# Patient Record
Sex: FEMALE | Race: ASIAN | NOT HISPANIC OR LATINO | URBAN - METROPOLITAN AREA
[De-identification: names, ages, dates, MRNs, and addresses within clinical notes are randomized per-mention and may not be internally consistent; named-entity substitution may affect disease eponyms.]

---

## 2023-07-09 ENCOUNTER — EMERGENCY (EMERGENCY)
Facility: HOSPITAL | Age: 63
LOS: 1 days | Discharge: HOME | End: 2023-07-09
Attending: EMERGENCY MEDICINE | Admitting: EMERGENCY MEDICINE
Payer: MEDICAID

## 2023-07-09 VITALS
WEIGHT: 118.83 LBS | SYSTOLIC BLOOD PRESSURE: 134 MMHG | TEMPERATURE: 98 F | HEART RATE: 82 BPM | OXYGEN SATURATION: 97 % | RESPIRATION RATE: 16 BRPM | DIASTOLIC BLOOD PRESSURE: 80 MMHG | HEIGHT: 60 IN

## 2023-07-09 VITALS
DIASTOLIC BLOOD PRESSURE: 78 MMHG | TEMPERATURE: 98 F | HEART RATE: 80 BPM | OXYGEN SATURATION: 99 % | RESPIRATION RATE: 16 BRPM | SYSTOLIC BLOOD PRESSURE: 115 MMHG

## 2023-07-09 LAB
ALBUMIN SERPL ELPH-MCNC: 4 G/DL — SIGNIFICANT CHANGE UP (ref 3.3–5)
ALP SERPL-CCNC: 90 U/L — SIGNIFICANT CHANGE UP (ref 30–120)
ALT FLD-CCNC: 29 U/L DA — SIGNIFICANT CHANGE UP (ref 10–60)
ANION GAP SERPL CALC-SCNC: 9 MMOL/L — SIGNIFICANT CHANGE UP (ref 5–17)
APTT BLD: 37.2 SEC — HIGH (ref 27.5–35.5)
AST SERPL-CCNC: 12 U/L — SIGNIFICANT CHANGE UP (ref 10–40)
BASOPHILS # BLD AUTO: 0.05 K/UL — SIGNIFICANT CHANGE UP (ref 0–0.2)
BASOPHILS NFR BLD AUTO: 0.5 % — SIGNIFICANT CHANGE UP (ref 0–2)
BILIRUB SERPL-MCNC: 0.6 MG/DL — SIGNIFICANT CHANGE UP (ref 0.2–1.2)
BUN SERPL-MCNC: 25 MG/DL — HIGH (ref 7–23)
CALCIUM SERPL-MCNC: 9.9 MG/DL — SIGNIFICANT CHANGE UP (ref 8.4–10.5)
CHLORIDE SERPL-SCNC: 103 MMOL/L — SIGNIFICANT CHANGE UP (ref 96–108)
CO2 SERPL-SCNC: 32 MMOL/L — HIGH (ref 22–31)
CREAT SERPL-MCNC: 0.87 MG/DL — SIGNIFICANT CHANGE UP (ref 0.5–1.3)
EGFR: 74 ML/MIN/1.73M2 — SIGNIFICANT CHANGE UP
EOSINOPHIL # BLD AUTO: 0.1 K/UL — SIGNIFICANT CHANGE UP (ref 0–0.5)
EOSINOPHIL NFR BLD AUTO: 1 % — SIGNIFICANT CHANGE UP (ref 0–6)
GLUCOSE SERPL-MCNC: 152 MG/DL — HIGH (ref 70–99)
HCT VFR BLD CALC: 38.8 % — SIGNIFICANT CHANGE UP (ref 34.5–45)
HGB BLD-MCNC: 13 G/DL — SIGNIFICANT CHANGE UP (ref 11.5–15.5)
IMM GRANULOCYTES NFR BLD AUTO: 0.6 % — SIGNIFICANT CHANGE UP (ref 0–0.9)
INR BLD: 1.13 RATIO — SIGNIFICANT CHANGE UP (ref 0.88–1.16)
LYMPHOCYTES # BLD AUTO: 1.84 K/UL — SIGNIFICANT CHANGE UP (ref 1–3.3)
LYMPHOCYTES # BLD AUTO: 17.5 % — SIGNIFICANT CHANGE UP (ref 13–44)
MCHC RBC-ENTMCNC: 32.3 PG — SIGNIFICANT CHANGE UP (ref 27–34)
MCHC RBC-ENTMCNC: 33.5 GM/DL — SIGNIFICANT CHANGE UP (ref 32–36)
MCV RBC AUTO: 96.3 FL — SIGNIFICANT CHANGE UP (ref 80–100)
MONOCYTES # BLD AUTO: 0.91 K/UL — HIGH (ref 0–0.9)
MONOCYTES NFR BLD AUTO: 8.7 % — SIGNIFICANT CHANGE UP (ref 2–14)
NEUTROPHILS # BLD AUTO: 7.56 K/UL — HIGH (ref 1.8–7.4)
NEUTROPHILS NFR BLD AUTO: 71.7 % — SIGNIFICANT CHANGE UP (ref 43–77)
NRBC # BLD: 0 /100 WBCS — SIGNIFICANT CHANGE UP (ref 0–0)
PLATELET # BLD AUTO: 435 K/UL — HIGH (ref 150–400)
POTASSIUM SERPL-MCNC: 3.6 MMOL/L — SIGNIFICANT CHANGE UP (ref 3.5–5.3)
POTASSIUM SERPL-SCNC: 3.6 MMOL/L — SIGNIFICANT CHANGE UP (ref 3.5–5.3)
PROT SERPL-MCNC: 8.1 G/DL — SIGNIFICANT CHANGE UP (ref 6–8.3)
PROTHROM AB SERPL-ACNC: 13 SEC — SIGNIFICANT CHANGE UP (ref 10.5–13.4)
RBC # BLD: 4.03 M/UL — SIGNIFICANT CHANGE UP (ref 3.8–5.2)
RBC # FLD: 13.2 % — SIGNIFICANT CHANGE UP (ref 10.3–14.5)
SODIUM SERPL-SCNC: 144 MMOL/L — SIGNIFICANT CHANGE UP (ref 135–145)
TROPONIN I, HIGH SENSITIVITY RESULT: 5.1 NG/L — SIGNIFICANT CHANGE UP
WBC # BLD: 10.52 K/UL — HIGH (ref 3.8–10.5)
WBC # FLD AUTO: 10.52 K/UL — HIGH (ref 3.8–10.5)

## 2023-07-09 PROCEDURE — 80053 COMPREHEN METABOLIC PANEL: CPT

## 2023-07-09 PROCEDURE — 85730 THROMBOPLASTIN TIME PARTIAL: CPT

## 2023-07-09 PROCEDURE — 71045 X-RAY EXAM CHEST 1 VIEW: CPT | Mod: 26

## 2023-07-09 PROCEDURE — 70496 CT ANGIOGRAPHY HEAD: CPT | Mod: MA

## 2023-07-09 PROCEDURE — 82962 GLUCOSE BLOOD TEST: CPT

## 2023-07-09 PROCEDURE — 36415 COLL VENOUS BLD VENIPUNCTURE: CPT

## 2023-07-09 PROCEDURE — 99285 EMERGENCY DEPT VISIT HI MDM: CPT

## 2023-07-09 PROCEDURE — 99285 EMERGENCY DEPT VISIT HI MDM: CPT | Mod: 25

## 2023-07-09 PROCEDURE — 93010 ELECTROCARDIOGRAM REPORT: CPT

## 2023-07-09 PROCEDURE — 71045 X-RAY EXAM CHEST 1 VIEW: CPT

## 2023-07-09 PROCEDURE — 70496 CT ANGIOGRAPHY HEAD: CPT | Mod: 26,MA

## 2023-07-09 PROCEDURE — 84484 ASSAY OF TROPONIN QUANT: CPT

## 2023-07-09 PROCEDURE — 70498 CT ANGIOGRAPHY NECK: CPT | Mod: MA

## 2023-07-09 PROCEDURE — 93005 ELECTROCARDIOGRAM TRACING: CPT

## 2023-07-09 PROCEDURE — 70498 CT ANGIOGRAPHY NECK: CPT | Mod: 26,MA

## 2023-07-09 PROCEDURE — 85025 COMPLETE CBC W/AUTO DIFF WBC: CPT

## 2023-07-09 PROCEDURE — 70450 CT HEAD/BRAIN W/O DYE: CPT | Mod: MA

## 2023-07-09 PROCEDURE — 85610 PROTHROMBIN TIME: CPT

## 2023-07-09 RX ORDER — VALACYCLOVIR 500 MG/1
1000 TABLET, FILM COATED ORAL ONCE
Refills: 0 | Status: COMPLETED | OUTPATIENT
Start: 2023-07-09 | End: 2023-07-09

## 2023-07-09 RX ORDER — VALACYCLOVIR 500 MG/1
1 TABLET, FILM COATED ORAL
Qty: 21 | Refills: 0
Start: 2023-07-09 | End: 2023-07-15

## 2023-07-09 RX ADMIN — VALACYCLOVIR 1000 MILLIGRAM(S): 500 TABLET, FILM COATED ORAL at 15:48

## 2023-07-09 RX ADMIN — Medication 60 MILLIGRAM(S): at 15:47

## 2023-07-09 NOTE — CONSULT NOTE ADULT - SUBJECTIVE AND OBJECTIVE BOX
Patient is a 63y old  Female who presents with a chief complaint of left facial droop    HPI:   #[id number]  64 y/o F Patient brought in by family for left facial weakness mild headache and lightheadedness since awoke at 6 AM this morning.  Patient was in her normal state of health when she went to bed at approximately 11 PM last night.  Patient relates when she woke up she her left side of her face seemed to be drooping and she could not keep food or drink in her mouth.  No nausea vomiting arm or leg symptoms chest pain short breath abdominal pain or any other complaints.  PMD in [place].  No weakness.  No numbness  No aphasia.  CT Head - No acute pathology  CTA head and Neck - Normal exam  Daughter is at bedside.    Home Medications: None     Allergies    No Known Allergies      SOCIAL HISTORY:    No h/o Smoking.   No h/o alcohol use.    FAMILY HISTORY: Asked the dtr. N/C    REVIEW OF SYSTEMS:    CONSTITUTIONAL: No fever  EYES: No eye pain,   ENMT:  No sinus or throat pain  NECK: No pain or stiffness  RESPIRATORY: No cough, No hemoptysis; No shortness of breath  CARDIOVASCULAR: No acute chest pain, palpitations,  or leg swelling  GASTROINTESTINAL: No abdominal pain. No nausea, vomiting, or hematemesis;    GENITOURINARY: No  hematuria, or incontinence  MUSCULOSKELETAL: No joint swelling; No extremity pain  SKIN: No itching, rashes, or lesions   LYMPH NODES: No enlarged glands  NEUROLOGICAL: No headaches, memory loss,   PSYCHIATRIC: No depression, anxiety, mood swings, or difficulty sleeping  ENDOCRINE: No heat or cold intolerance;   HEME/LYMPH: No easy bruising, or bleeding gums  Allergy/Immunology. No medication allergy. No seasonal allergies.    PHYSICAL EXAM:  Vital Signs Last 24 Hrs  T(F): 98.4 (07-09-23 @ 12:59)  HR: 85 (07-09-23 @ 12:59)  BP: 124/71 (07-09-23 @ 12:59)  RR: 16 (07-09-23 @ 12:59)    GENERAL: NAD, well-groomed, well-developed  HEAD:  Atraumatic, Normocephalic  EYES: EOMI, PERRLA, conjunctiva and sclera clear  NECK: Supple, No JVD, thyroid non-palpable    On Neurological Examination:    Mental Status - Pt is alert, awake, oriented X3. Higher functions are intact. Follows commands well and able to answer questions appropriately.    Speech -  Normal. Pt has no aphasia.    Cranial Nerves - Pupils 3 mm equal and reactive to light, extraocular eye movements intact. Pt has no visual field deficit.  Left facial asymmetry of peripheral type. Tongue - is in midline.    Motor Exam - 4 plus/5 all over, No drift. No shaking or tremors.  Muscle tone - is normal all over. Moves all extremities equally. No asymmetry is seen.      Sensory Exam - Pin prick, temperature, joint position and vibration are intact on either side.     Gait - Able to stand and walk unassisted. Pt is able to stand up with holding my hands and is able to walk for few feet around the bed. Not falling to either side.    Deep tendon Reflexes - 2 plus all over.    Coordination - Fine finger movements are normal on both sides. Finger to nose is also normal on both sides.       Romberg - Negative.    Neck Supple -  Yes.    LABS:                        13.0   10.52 )-----------( 435      ( 09 Jul 2023 12:00 )             38.8     07-09    144  |  103  |  25<H>  ----------------------------<  152<H>  3.6   |  32<H>  |  0.87    Ca    9.9      09 Jul 2023 12:00    TPro  8.1  /  Alb  4.0  /  TBili  0.6  /  DBili  x   /  AST  12  /  ALT  29  /  AlkPhos  90  07-09    PT/INR - ( 09 Jul 2023 12:00 )   PT: 13.0 sec;   INR: 1.13 ratio      PTT - ( 09 Jul 2023 12:00 )  PTT:37.2 sec  Urinalysis Basic - ( 09 Jul 2023 12:00 )    Color: x / Appearance: x / SG: x / pH: x  Gluc: 152 mg/dL / Ketone: x  / Bili: x / Urobili: x   Blood: x / Protein: x / Nitrite: x   Leuk Esterase: x / RBC: x / WBC x   Sq Epi: x / Non Sq Epi: x / Bacteria: x    RADIOLOGY & ADDITIONAL STUDIES:    < from: CT Angio Brain Stroke Protocol  w/ IV Cont (07.09.23 @ 12:11) >    IMPRESSION:    1.Right carotid system:    No hemodynamically significant stenosis.    2.   Left carotid system:     No hemodynamically significant stenosis.    3.   Vertebral circulation:    Patent.    4.  Anterior intracranial circulation:     Unremarkable.    5.  Posterior intracranial circulation:    Unremarkable.    6.  No large vessel occlusion.    7. Brain:   No infarct is identified.    < end of copied text >

## 2023-07-09 NOTE — ED ADULT TRIAGE NOTE - MODE OF ARRIVAL
Personalized Preventive Plan for Beatriz Solis - 9/28/2020  Medicare offers a range of preventive health benefits. Some of the tests and screenings are paid in full while other may be subject to a deductible, co-insurance, and/or copay. Some of these benefits include a comprehensive review of your medical history including lifestyle, illnesses that may run in your family, and various assessments and screenings as appropriate. After reviewing your medical record and screening and assessments performed today your provider may have ordered immunizations, labs, imaging, and/or referrals for you. A list of these orders (if applicable) as well as your Preventive Care list are included within your After Visit Summary for your review. Other Preventive Recommendations:    · A preventive eye exam performed by an eye specialist is recommended every 1-2 years to screen for glaucoma; cataracts, macular degeneration, and other eye disorders. · A preventive dental visit is recommended every 6 months. · Try to get at least 150 minutes of exercise per week or 10,000 steps per day on a pedometer . · Order or download the FREE \"Exercise & Physical Activity: Your Everyday Guide\" from [employer]. Call 5-995.236.6625 or search [employer] online. · You need 1338-7748 mg of calcium and 6343-6723 IU of vitamin D per day. It is possible to meet your calcium requirement with diet alone, but a vitamin D supplement is usually necessary to meet this goal.  · When exposed to the sun, use a sunscreen that protects against both UVA and UVB radiation with an SPF of 30 or greater. Reapply every 2 to 3 hours or after sweating, drying off with a towel, or swimming. · Always wear a seat belt when traveling in a car. Always wear a helmet when riding a bicycle or motorcycle. Walk in Private Auto

## 2023-07-09 NOTE — ED ADULT NURSE NOTE - OBJECTIVE STATEMENT
Pt is alert and oriented. Pt states that she went to bed at 23:00 and woke up at 06:00. Pt states that she has blurry vision in the left eye, left sided facial droop and dizziness. Pt does not have weakness in her extremities. Pt states that she had a 100.8 fever a few days ago. Pt has equal sensation on both sides of her body. Pt denies sob, chest pain, nausea, and vomiting. Pt resp are even and unlabored, skin color west for race. Pt updated on plan of care.

## 2023-07-09 NOTE — ED ADULT NURSE NOTE - NSFALLRISKINTERV_ED_ALL_ED

## 2023-07-09 NOTE — ED PROVIDER NOTE - PROGRESS NOTE DETAILS
job atkinson) seen damaris pt, recommends d/c with prednisone 60mg qd x 7 days and valtrex tid x 7 days and f/u at neuro clinic. Reevaluated patient at bedside with family xlation.  Patient feeling much improved.  Discussed the results of all diagnostic testing in ED and copies of all reports given.   An opportunity to ask questions was given.  Discussed the importance of prompt, close medical follow-up.  Patient will return with any changes, concerns or persistent / worsening symptoms.  Understanding of all instructions verbalized.

## 2023-07-09 NOTE — ED PROVIDER NOTE - PATIENT PORTAL LINK FT
You can access the FollowMyHealth Patient Portal offered by [facility] by registering at the following website: [email or web address]. By joining [facility] FollowMyHealth portal, you will also be able to view your health information using other applications (apps) compatible with our system.

## 2023-07-09 NOTE — ED ADULT NURSE NOTE - CAS DISCH TRANSFER METHOD
Quality 47: Advance Care Plan: Advance care planning not documented, reason not otherwise specified.
Quality 431: Preventive Care And Screening: Unhealthy Alcohol Use - Screening: Patient screened for unhealthy alcohol use using a single question and scores less than 2 times per year
Quality 111:Pneumonia Vaccination Status For Older Adults: Pneumococcal Vaccination not Administered or Previously Received, Reason not Otherwise Specified
Quality 226: Preventive Care And Screening: Tobacco Use: Screening And Cessation Intervention: Patient screened for tobacco use and is an ex/non-smoker
Detail Level: Detailed
Quality 130: Documentation Of Current Medications In The Medical Record: Current Medications Documented
Private car

## 2023-07-09 NOTE — ED PROVIDER NOTE - NSFOLLOWUPINSTRUCTIONS_ED_ALL_ED_FT
Cornelius's Palsy, Adult    Bell's palsy is a short-term inability to move muscles in a part of the face. The inability to move, also called paralysis, results from inflammation or compression of the seventh cranial nerve. This nerve travels along the skull and under the ear to the side of the face. This nerve is responsible for facial movements that include blinking, closing the eyes, smiling, and frowning.    What are the causes?  The exact cause of this condition is not known. It may be caused by an infection from a virus, such as the chickenpox (herpes zoster), Rogelio–Barr, or mumps virus.    What increases the risk?  You are more likely to develop this condition if:  You are pregnant.  You have diabetes.  You have had a recent infection in your nose, throat, or airways.  You have a weakened body defense system (immune system).  You have had a facial injury, such as a fracture.  You have a family history of Bell's palsy.  What are the signs or symptoms?  Symptoms of this condition include:  Weakness on one side of the face.  Drooping eyelid and corner of the mouth.  Excessive tearing in one eye.  Difficulty closing the eyelid.  Dry eye.  Drooling.  Dry mouth.  Changes in taste.  Change in facial appearance.  Pain behind one ear.  Ringing in one or both ears.  Sensitivity to sound in one ear.  Facial twitching.  Headache.  Impaired speech.  Dizziness.  Difficulty eating or drinking.  Most of the time, only one side of the face is affected. In rare cases, Bell's palsy may affect the whole face.    How is this diagnosed?  This condition is diagnosed based on:  Your symptoms.  Your medical history.  A physical exam.  You may also have to see health care providers who specialize in disorders of the nerves (neurologist) or diseases and conditions of the eye (ophthalmologist). You may have tests, such as:  A test to check for nerve damage (electromyogram).  Imaging studies, such as a CT scan or an MRI.  Blood tests.  How is this treated?  This condition affects every person differently. Sometimes symptoms go away without treatment within a couple weeks. If treatment is needed, it varies from person to person. The goal of treatment is to reduce inflammation and protect the eye from damage. Treatment for Bell's palsy may include:  Medicines, such as:  Steroids to reduce swelling and inflammation.  Antiviral medicines.  Pain relievers, including aspirin, acetaminophen, or ibuprofen.  Eye drops or ointment to keep your eye moist.  Eye protection, if you cannot close your eye.  Exercises or massage to regain muscle strength and function (physical therapy).  Follow these instructions at home:    Take over-the-counter and prescription medicines only as told by your health care provider.  If your eye is affected:  Keep your eye moist with eye drops or ointment as told by your health care provider.  Follow instructions for eye care and protection as told by your health care provider.  Do any physical therapy exercises as told by your health care provider.  Keep all follow-up visits. This is important.  Contact a health care provider if:  You have a fever or chills.  Your symptoms do not get better within 2–3 weeks, or your symptoms get worse.  Your eye is red, irritated, or painful.  You have new symptoms.  Get help right away if:  You have weakness or numbness in a part of your body other than your face.  You have trouble swallowing.  You develop neck pain or stiffness.  You develop dizziness or shortness of breath.  Summary  Bell's palsy is a short-term inability to move muscles in a part of the face. The inability to move results from inflammation or compression of the facial nerve.  This condition affects every person differently. Sometimes symptoms go away without treatment within a couple weeks.  If treatment is needed, it varies from person to person. The goal of treatment is to reduce inflammation and protect the eye from damage.  Contact your health care provider if your symptoms do not get better within 2–3 weeks, or your symptoms get worse.  This information is not intended to replace advice given to you by your health care provider. Make sure you discuss any questions you have with your health care provider.

## 2023-07-09 NOTE — CONSULT NOTE ADULT - ASSESSMENT
Left facial droop of peripheral type  Difficulty closing left eye fully with Bell's phenomenon  No signs of CVA.  Symptoms are sec to Left Bell's Palsy  Rec Prednisone - 60 mg daily for 7 days.  Valtrex 1000 mg TID for 5 days.  Keeps left eye closed during sleep  DC pt home.  F/up in  [facility] neurology clinic  -rec out pt MRI brain  D/w opt/Daughter.  Return to ED if any further weakness or numbness  D/w Dr. Win.

## 2023-07-09 NOTE — ED ADULT TRIAGE NOTE - CHIEF COMPLAINT QUOTE
left eye not closing and right facial droop spills food since awakening. had fever few days ago 100.8 gait steady left eye not closing and left facial droop spills food since awakening. had fever few days ago 100.8 gait steady

## 2023-07-09 NOTE — ED PROVIDER NOTE - CLINICAL SUMMARY MEDICAL DECISION MAKING FREE TEXT BOX
#[id number]  Patient brought in by family for left facial weakness mild headache and lightheadedness since awoke at 6 AM this morning.  Patient was in her normal state of health when she went to bed at approximately 11 PM last night.  Patient relates when she woke up she her left side of her face seemed to be drooping and she could not keep food or drink in her mouth.  No nausea vomiting arm or leg symptoms chest pain short breath abdominal pain or any other complaints.  PMD in [place]    Plan stroke protocol including CT head CTA head and neck EKG chest x-ray labs neurology consult    Differential including but not limited to ICH CVA TIA Cornelius's palsy electrolyte abnormality

## 2023-07-09 NOTE — ED ADULT NURSE NOTE - CHIEF COMPLAINT QUOTE
left eye not closing and left facial droop spills food since awakening. had fever few days ago 100.8 gait steady

## 2023-07-09 NOTE — ED PROVIDER NOTE - DIFFERENTIAL DIAGNOSIS
Differential Diagnosis Differential including but not limited to ICH CVA TIA Cornelius's palsy electrolyte abnormality

## 2023-07-09 NOTE — ED PROVIDER NOTE - OBJECTIVE STATEMENT
#[id number]  Patient brought in by family for left facial weakness mild headache and lightheadedness since awoke at 6 AM this morning.  Patient was in her normal state of health when she went to bed at approximately 11 PM last night.  Patient relates when she woke up she her left side of her face seemed to be drooping and she could not keep food or drink in her mouth.  No nausea vomiting arm or leg symptoms chest pain short breath abdominal pain or any other complaints.  PMD in [place]

## 2023-07-09 NOTE — ED PROVIDER NOTE - NEUROLOGICAL, MLM
Alert and oriented, left facial palsy, otherwise no focal deficits, no motor or sensory deficits. see NIH below

## 2023-07-09 NOTE — ED PROVIDER NOTE - NSFOLLOWUPCLINICS_GEN_ALL_ED_FT
[facility]  Neurology  [address] [place] [address] [address] [place] [address]  Phone: [phone]  Fax:   Follow Up Time: 1-3 Days

## 2023-10-07 ENCOUNTER — EMERGENCY (EMERGENCY)
Facility: HOSPITAL | Age: 63
LOS: 1 days | Discharge: HOME | End: 2023-10-07
Attending: EMERGENCY MEDICINE | Admitting: EMERGENCY MEDICINE
Payer: MEDICAID

## 2023-10-07 VITALS
HEART RATE: 72 BPM | SYSTOLIC BLOOD PRESSURE: 153 MMHG | DIASTOLIC BLOOD PRESSURE: 80 MMHG | TEMPERATURE: 98 F | OXYGEN SATURATION: 96 % | RESPIRATION RATE: 16 BRPM

## 2023-10-07 VITALS
RESPIRATION RATE: 18 BRPM | HEIGHT: 59.06 IN | TEMPERATURE: 98 F | HEART RATE: 71 BPM | DIASTOLIC BLOOD PRESSURE: 85 MMHG | OXYGEN SATURATION: 99 % | SYSTOLIC BLOOD PRESSURE: 166 MMHG | WEIGHT: 119.93 LBS

## 2023-10-07 PROCEDURE — 73564 X-RAY EXAM KNEE 4 OR MORE: CPT

## 2023-10-07 PROCEDURE — 12002 RPR S/N/AX/GEN/TRNK2.6-7.5CM: CPT

## 2023-10-07 PROCEDURE — 73564 X-RAY EXAM KNEE 4 OR MORE: CPT | Mod: 26,LT

## 2023-10-07 PROCEDURE — 90715 TDAP VACCINE 7 YRS/> IM: CPT

## 2023-10-07 PROCEDURE — 96365 THER/PROPH/DIAG IV INF INIT: CPT | Mod: XU

## 2023-10-07 PROCEDURE — 99284 EMERGENCY DEPT VISIT MOD MDM: CPT | Mod: 25

## 2023-10-07 PROCEDURE — 90471 IMMUNIZATION ADMIN: CPT

## 2023-10-07 PROCEDURE — 99285 EMERGENCY DEPT VISIT HI MDM: CPT | Mod: 25

## 2023-10-07 RX ORDER — PIOGLITAZONE HYDROCHLORIDE 15 MG/1
1 TABLET ORAL
Refills: 0 | DISCHARGE

## 2023-10-07 RX ORDER — METFORMIN HYDROCHLORIDE 850 MG/1
1 TABLET ORAL
Refills: 0 | DISCHARGE

## 2023-10-07 RX ORDER — ATORVASTATIN CALCIUM 80 MG/1
1 TABLET, FILM COATED ORAL
Refills: 0 | DISCHARGE

## 2023-10-07 RX ORDER — CEFAZOLIN SODIUM 1 G
2000 VIAL (EA) INJECTION ONCE
Refills: 0 | Status: COMPLETED | OUTPATIENT
Start: 2023-10-07 | End: 2023-10-07

## 2023-10-07 RX ORDER — ATENOLOL 25 MG/1
1 TABLET ORAL
Refills: 0 | DISCHARGE

## 2023-10-07 RX ORDER — ASPIRIN/CALCIUM CARB/MAGNESIUM 324 MG
1 TABLET ORAL
Refills: 0 | DISCHARGE

## 2023-10-07 RX ORDER — TETANUS TOXOID, REDUCED DIPHTHERIA TOXOID AND ACELLULAR PERTUSSIS VACCINE, ADSORBED 5; 2.5; 8; 8; 2.5 [IU]/.5ML; [IU]/.5ML; UG/.5ML; UG/.5ML; UG/.5ML
0.5 SUSPENSION INTRAMUSCULAR ONCE
Refills: 0 | Status: COMPLETED | OUTPATIENT
Start: 2023-10-07 | End: 2023-10-07

## 2023-10-07 RX ORDER — LOSARTAN POTASSIUM 100 MG/1
1 TABLET, FILM COATED ORAL
Refills: 0 | DISCHARGE

## 2023-10-07 RX ORDER — SITAGLIPTIN 50 MG/1
1 TABLET, FILM COATED ORAL
Refills: 0 | DISCHARGE

## 2023-10-07 RX ORDER — GLIMEPIRIDE 1 MG
1 TABLET ORAL
Refills: 0 | DISCHARGE

## 2023-10-07 RX ORDER — METOPROLOL TARTRATE 50 MG
1 TABLET ORAL
Refills: 0 | DISCHARGE

## 2023-10-07 RX ADMIN — Medication 2000 MILLIGRAM(S): at 23:00

## 2023-10-07 RX ADMIN — TETANUS TOXOID, REDUCED DIPHTHERIA TOXOID AND ACELLULAR PERTUSSIS VACCINE, ADSORBED 0.5 MILLILITER(S): 5; 2.5; 8; 8; 2.5 SUSPENSION INTRAMUSCULAR at 22:15

## 2023-10-07 RX ADMIN — Medication 100 MILLIGRAM(S): at 22:16

## 2023-10-07 NOTE — ED PROVIDER NOTE - OBJECTIVE STATEMENT
Hx via Mandarin interpretation at bedside as per pt request. 63-year-old female with hx of HTN, HLD, DM, on aspirin 81mg presents with c/o left knee laceration and pain s/p fall today. States that she missed a step going down the stairs in her backyard today and fell forward hitting her left knee and sustained laceration. Unsure of last tetanus. Denies numbness, tingling, head trauma, LOC, neck/back/hip pain, other injuries/symptoms. Hx via Mandarin interpretation at bedside as per pt request. 63-year-old female with hx of HTN, HLD, DM, on aspirin 81mg presents with c/o left knee laceration and pain s/p fall today. States that she missed a step going down the stairs in her backyard today and fell forward hitting her left knee on the step and sustained laceration. Unsure of last tetanus. Denies numbness, tingling, head trauma, LOC, neck/back/hip pain, other injuries/symptoms.

## 2023-10-07 NOTE — ED PROCEDURE NOTE - NS ED ATTENDING STATEMENT MOD
This was a shared visit with the AGAPITO. I reviewed and verified the documentation and independently performed the documented:
This was a shared visit with the AGAPITO. I reviewed and verified the documentation and independently performed the documented:

## 2023-10-07 NOTE — ED PROVIDER NOTE - NSFOLLOWUPINSTRUCTIONS_ED_ALL_ED_FT
Follow-up with orthopedist on Monday, 10/9 with appointment for reevaluation, ongoing care and treatment.  Rest, elevate knee with knee immobilizer.  Keep wound clean and dry.  Use crutches for support.  Take Tylenol as directed for pain.  If having worsening of symptoms, wound redness/drainage/streaking, fever or other related symptoms, return to the ER immediately.    Sutured Wound Care  Sutures are stitches that can be used to close wounds. Some stitches break down as they heal (absorbable). Other stitches need to be taken out by your doctor (nonabsorbable). Taking good care of your wound can help to prevent pain and infection. It can also help your wound heal more quickly. Follow instructions from your doctor about how to care for your sutured wound.    Supplies needed:  Soap and water.  A clean, dry towel.  Solution to clean your wound, if needed.  A clean gauze or bandage (dressing), if needed.  Antibiotic ointment, if told by your doctor.  How to care for your sutured wound  Two stitched wounds. One is normal. The other is red with pus and infected.  Keep the wound fully dry for the first 24 hours or as long as told by your doctor. After 24–48 hours, you may shower or bathe as told by your doctor. Do not soak the wound or put the wound under water until the stitches have been taken out.  After the first 24 hours, clean the wound once a day, or as often as your doctor tells you to. Take these steps:  Wash and rinse the wound as told by your health care provider.  Pat the wound dry with a clean towel. Do not rub the wound.  After cleaning the wound, put a thin layer of antibiotic ointment on the wound as told by your doctor. This will help:  Prevent infection.  Keep the bandage from sticking to the wound.  Follow instructions from your doctor about how to change your bandage. Make sure you:  Wash your hands with soap and water for at least 20 seconds. If you cannot use soap and water, use hand .  Change your bandage at least once a day, or as often as told by your doctor. If your dressing gets wet or dirty, change it.  Leavestitches in place for at least 2 weeks. If you have skin glue over your stitches, this should also stay in place for at least 2 weeks.  Leave tape strips alone (if you have them) unless you are told to take them off. You may trim the edges of the tape strips if they curl up.  Check your wound every day for signs of infection. Watch for:  Redness, swelling, or pain.  Fluid or blood.  New warmth, a rash, or hardness at the wound site.  Pus or a bad smell.  Have the stitches taken out as told by your doctor.  Follow these instructions at home:  Medicines    Take or apply over-the-counter and prescription medicines only as told by your doctor.  If you were prescribed an antibiotic medicine or ointment, take or apply it as told by your doctor. Do not stop using the antibiotic even if you start to feel better.  General instructions    Cover your wound with clothes or put sunscreen on when you are outside. Use a sunscreen of at least 30 SPF.  Do not scratch or pick at your wound.  Avoid stretching your wound.  Raise the injured area above the level of your heart while you are sitting or lying down, if possible.  Eat a diet that includes protein, vitamin A, and vitamin C. Doing this will help your wound heal.  Drink enough fluid to keep your pee (urine) pale yellow.  Keep all follow-up visits.  Contact a doctor if:  You were given a tetanus shot and you have any of the following at the site where the needle went in:  Swelling.  Very bad pain.  Redness.  Bleeding.  Your wound breaks open.  You see something coming out of your wound, such as wood or glass.  You have any of these signs of infection in or around your wound:  Redness, swelling, or pain.  Fluid or blood.  Warmth.  A new rash.  Your wound feels hard.  You have a fever.  The skin near your wound changes color.  You have pain that does not get better with medicine.  You get numbness around the wound.  Get help right away if:  You have very bad swelling or more pain around your wound.  You have pus or a bad smell coming from your wound.  You have painful lumps near your wound or anywhere on your body.  You have a red streak going away from your wound.  The wound is on your hand or foot, and:  Your fingers or toes look pale or blue.  You cannot move a finger or toe as you used to do.  You have numbness that spreads down your hand, foot, fingers, or toes.  Summary  Sutures are stitches that are used to close wounds.  Taking good care of your wound can help to prevent pain and infection.  Keep the wound fully dry for the first 24 hours or for as long as told by your doctor. After 24–48 hours, you may shower or bathe as told by your doctor.  This information is not intended to replace advice given to you by your health care provider. Make sure you discuss any questions you have with your health care provider. Follow-up with orthopedist on Monday, 10/9 with appointment for reevaluation, ongoing care and treatment.  Rest, elevate knee with knee immobilizer.  Keep wound clean and dry.  Use crutches for support. Take full course of antibiotics as prescribed. Take Tylenol as directed for pain.  If having worsening of symptoms, wound redness/drainage/streaking, fever or other related symptoms, return to the ER immediately.    Sutured Wound Care  Sutures are stitches that can be used to close wounds. Some stitches break down as they heal (absorbable). Other stitches need to be taken out by your doctor (nonabsorbable). Taking good care of your wound can help to prevent pain and infection. It can also help your wound heal more quickly. Follow instructions from your doctor about how to care for your sutured wound.    Supplies needed:  Soap and water.  A clean, dry towel.  Solution to clean your wound, if needed.  A clean gauze or bandage (dressing), if needed.  Antibiotic ointment, if told by your doctor.  How to care for your sutured wound  Two stitched wounds. One is normal. The other is red with pus and infected.  Keep the wound fully dry for the first 24 hours or as long as told by your doctor. After 24–48 hours, you may shower or bathe as told by your doctor. Do not soak the wound or put the wound under water until the stitches have been taken out.  After the first 24 hours, clean the wound once a day, or as often as your doctor tells you to. Take these steps:  Wash and rinse the wound as told by your health care provider.  Pat the wound dry with a clean towel. Do not rub the wound.  After cleaning the wound, put a thin layer of antibiotic ointment on the wound as told by your doctor. This will help:  Prevent infection.  Keep the bandage from sticking to the wound.  Follow instructions from your doctor about how to change your bandage. Make sure you:  Wash your hands with soap and water for at least 20 seconds. If you cannot use soap and water, use hand .  Change your bandage at least once a day, or as often as told by your doctor. If your dressing gets wet or dirty, change it.  Leavestitches in place for at least 2 weeks. If you have skin glue over your stitches, this should also stay in place for at least 2 weeks.  Leave tape strips alone (if you have them) unless you are told to take them off. You may trim the edges of the tape strips if they curl up.  Check your wound every day for signs of infection. Watch for:  Redness, swelling, or pain.  Fluid or blood.  New warmth, a rash, or hardness at the wound site.  Pus or a bad smell.  Have the stitches taken out as told by your doctor.  Follow these instructions at home:  Medicines    Take or apply over-the-counter and prescription medicines only as told by your doctor.  If you were prescribed an antibiotic medicine or ointment, take or apply it as told by your doctor. Do not stop using the antibiotic even if you start to feel better.  General instructions    Cover your wound with clothes or put sunscreen on when you are outside. Use a sunscreen of at least 30 SPF.  Do not scratch or pick at your wound.  Avoid stretching your wound.  Raise the injured area above the level of your heart while you are sitting or lying down, if possible.  Eat a diet that includes protein, vitamin A, and vitamin C. Doing this will help your wound heal.  Drink enough fluid to keep your pee (urine) pale yellow.  Keep all follow-up visits.  Contact a doctor if:  You were given a tetanus shot and you have any of the following at the site where the needle went in:  Swelling.  Very bad pain.  Redness.  Bleeding.  Your wound breaks open.  You see something coming out of your wound, such as wood or glass.  You have any of these signs of infection in or around your wound:  Redness, swelling, or pain.  Fluid or blood.  Warmth.  A new rash.  Your wound feels hard.  You have a fever.  The skin near your wound changes color.  You have pain that does not get better with medicine.  You get numbness around the wound.  Get help right away if:  You have very bad swelling or more pain around your wound.  You have pus or a bad smell coming from your wound.  You have painful lumps near your wound or anywhere on your body.  You have a red streak going away from your wound.  The wound is on your hand or foot, and:  Your fingers or toes look pale or blue.  You cannot move a finger or toe as you used to do.  You have numbness that spreads down your hand, foot, fingers, or toes.  Summary  Sutures are stitches that are used to close wounds.  Taking good care of your wound can help to prevent pain and infection.  Keep the wound fully dry for the first 24 hours or for as long as told by your doctor. After 24–48 hours, you may shower or bathe as told by your doctor.  This information is not intended to replace advice given to you by your health care provider. Make sure you discuss any questions you have with your health care provider.

## 2023-10-07 NOTE — ED ADULT NURSE NOTE - NSFALLLASTSIX_ED_ALL_ED
Central Line Type: Port  Central Line Site: Right  and Chest  Port cleansed with ChloraPrep per protocol.  Accessed via: 20 gauge Rm needle  Patency Verification: Blood return greater or equal to 3 ml. Labs drawn.  Port flushed with: 10 ml 0.9 normal saline  Rm needle removed: No     Yes.

## 2023-10-07 NOTE — ED PROVIDER NOTE - MUSCULOSKELETAL, MLM
5.5cm laceration 5.5cm irregular laceration noted to the anterior aspect of the left knee inferior to the patella, ?patellar tendon rupture although patient still able to actively extend her knee, toes warm & mobile, distal pulses and sensation intact, NVI

## 2023-10-07 NOTE — ED ADULT NURSE NOTE - OBJECTIVE STATEMENT
patient is a 62 y/o female c/c of fall and large laceration to left knee. Active bright red bleeding noted, bleed controlled. pt unable to bear weight on left leg.  patient denies head strike or LOC. pt not on anticoagulant. unknown last tetanus. pt's daughter in law and friend at bedside.

## 2023-10-07 NOTE — ED PROVIDER NOTE - PATIENT PORTAL LINK FT
You can access the FollowMyHealth Patient Portal offered by [facility] by registering at the following website: [email or web address]. By joining [employer]’s FollowMyHealth portal, you will also be able to view your health information using other applications (apps) compatible with our system.

## 2023-10-07 NOTE — ED ADULT NURSE NOTE - NS ED NURSE DC INFO COMPLEXITY
Complex: Multiple Rx/Tx. Pt has difficulty understanding. Requires additional help/Returned Demonstration/Verbalized Understanding

## 2023-10-07 NOTE — ED PROVIDER NOTE - PROGRESS NOTE DETAILS
Spoke to Dr. Molina who reviewed xrays and images of the laceration, advised pt with patellar tendon laceration with avulsion of tibial tuberosity, advised to repair laceration in ED and place knee immobilizer, f/u with office on monday, rx duricef. Reevaluated patient at bedside.  Patient feeling much improved.  Discussed the results of all diagnostic testing in ED and copies of all reports given.   An opportunity to ask questions was given.  Discussed the importance of prompt, close medical follow-up.  Patient will return with any changes, concerns or persistent / worsening symptoms.  Understanding of all instructions verbalized. Laceration repaired, pt tolerated well, NAD, NVI, wound covered with sterile dressing and gauze. Ace wrap and knee immobilizer placed, crutches given, rx for duricef sent, tylenol prn pain, f/u ortho. Reevaluated patient at bedside.  Patient feeling much improved.  Discussed the results of all diagnostic testing in ED and copies of all reports given.   An opportunity to ask questions was given.  Discussed the importance of prompt, close medical follow-up.  Patient will return with any changes, concerns or persistent / worsening symptoms.  Understanding of all instructions verbalized. Spoke to ortho, Dr. Molina who reviewed xrays of knee and images of the laceration, advised pt with patellar tendon laceration with avulsion of tibial tuberosity, advised to repair laceration in ED and place knee immobilizer, f/u with office on monday, rx duricef.

## 2023-10-07 NOTE — ED PROVIDER NOTE - SKIN, MLM
Skin normal color for race, warm, dry and intact. No evidence of rash. 5.5cm irregular laceration noted to the anterior aspect of the left knee inferior to the patella

## 2023-10-07 NOTE — ED ADULT NURSE NOTE - NSFALLRISKINTERV_ED_ALL_ED

## 2023-10-07 NOTE — ED PROVIDER NOTE - NS ED ATTENDING STATEMENT MOD
This was a shared visit with the AGAPITO. I reviewed and verified the documentation and independently performed the documented:

## 2023-10-07 NOTE — ED PROVIDER NOTE - CPE EDP ENMT NORM
Pt presents to ED per [facility] department after episode of cutting on left wrist. There are numerous superficial lacerations over the left wrist. The pt denies any desire to harm herself. 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 She then cut herself. She ha s cut herself in the past but not in the last five years.
normal...

## 2023-10-07 NOTE — ED PROVIDER NOTE - CARE PLAN
1 Principal Discharge DX:	Laceration of knee, left, complicated   Principal Discharge DX:	Laceration of knee, left, complicated  Secondary Diagnosis:	Fracture of left tibial tuberosity

## 2023-10-07 NOTE — ED PROVIDER NOTE - CARE PROVIDER_API CALL
Lemuel Molina.  Orthopaedic Surgery  [address], [address]  [place] [address]  Phone: [phone]  Fax: [phone]  Follow Up Time: 1-3 Days

## 2023-10-07 NOTE — ED PROCEDURE NOTE - CPROC ED TIME OUT STATEMENT1
“Patient's name, , procedure and correct site were confirmed during the [place] Timeout.”
“Patient's name, , procedure and correct site were confirmed during the [place] Timeout.”

## 2023-10-07 NOTE — ED ADULT TRIAGE NOTE - CHIEF COMPLAINT QUOTE
I was walking down the stairs outside and I fell and hurt my left knee; denies head injury; denies LOC; unsure of last tetanus

## 2023-10-07 NOTE — ED PROCEDURE NOTE - ATTENDING APP SHARED VISIT CONTRIBUTION OF CARE
Dc Lanza MD: I have personally performed a face to face diagnostic evaluation on this patient.  I have reviewed the PA note and agree with the history, exam, and plan of care, except as noted.  History and Exam by me shows same findings as documented        knee splinted
Dc Lanza MD: I have personally performed a face to face diagnostic evaluation on this patient.  I have reviewed the PA note and agree with the history, exam, and plan of care, except as noted.  History and Exam by me shows same findings as documented    Wound closed

## 2023-10-07 NOTE — ED PROVIDER NOTE - CLINICAL SUMMARY MEDICAL DECISION MAKING FREE TEXT BOX
Patient presents to emergency department after a fall in her backyard.  Fell going down steps and hit her left knee on the step.  Patient has a laceration to the anterior aspect of the knee inferior to the patella.  Exam reveals a possible patellar tendon rupture though patient still able to actively extend her knee.  We will get x-ray and discussed with Ortho.

## 2023-10-07 NOTE — ED PROVIDER NOTE - ATTENDING APP SHARED VISIT CONTRIBUTION OF CARE
Dc Lanza MD: I have personally performed a face to face diagnostic evaluation on this patient.  I have reviewed the PA note and agree with the history, exam, and plan of care, except as noted.  History and Exam by me shows same findings as documented

## 2023-10-07 NOTE — ED ADULT NURSE REASSESSMENT NOTE - NS ED NURSE REASSESS COMMENT FT1
Left knee wound repaired and bandaged by the PA. Knee immobilizer placed on left leg and pt instructed in crutch walking. Pt denies any discomfort at this time. Pt teaching done through family re: f/u with ortho, antibiotics, activity, and pain control as needed.

## 2023-10-08 PROBLEM — E11.9 TYPE 2 DIABETES MELLITUS WITHOUT COMPLICATIONS: Chronic | Status: ACTIVE | Noted: 2023-07-09

## 2023-10-08 PROBLEM — I10 ESSENTIAL (PRIMARY) HYPERTENSION: Chronic | Status: ACTIVE | Noted: 2023-07-09

## 2023-10-08 PROBLEM — E78.00 PURE HYPERCHOLESTEROLEMIA, UNSPECIFIED: Chronic | Status: ACTIVE | Noted: 2023-07-09

## 2023-10-09 ENCOUNTER — APPOINTMENT (OUTPATIENT)
Dept: ORTHOPEDIC SURGERY | Facility: CLINIC | Age: 63
End: 2023-10-09
Payer: MEDICAID

## 2023-10-09 ENCOUNTER — NON-APPOINTMENT (OUTPATIENT)
Age: 63
End: 2023-10-09

## 2023-10-09 VITALS
SYSTOLIC BLOOD PRESSURE: 127 MMHG | BODY MASS INDEX: 23.56 KG/M2 | HEART RATE: 69 BPM | HEIGHT: 60 IN | DIASTOLIC BLOOD PRESSURE: 77 MMHG | WEIGHT: 120 LBS

## 2023-10-09 PROBLEM — Z00.00 ENCOUNTER FOR PREVENTIVE HEALTH EXAMINATION: Status: ACTIVE | Noted: 2023-10-09

## 2023-10-09 PROCEDURE — 99204 OFFICE O/P NEW MOD 45 MIN: CPT

## 2023-10-09 RX ORDER — LOSARTAN POTASSIUM 100 MG/1
100 TABLET, FILM COATED ORAL
Refills: 0 | Status: ACTIVE | COMMUNITY

## 2023-10-09 RX ORDER — ATORVASTATIN CALCIUM 20 MG/1
20 TABLET, FILM COATED ORAL
Refills: 0 | Status: ACTIVE | COMMUNITY

## 2023-10-09 RX ORDER — ATENOLOL 25 MG/1
25 TABLET ORAL
Refills: 0 | Status: ACTIVE | COMMUNITY

## 2023-10-09 RX ORDER — SITAGLIPTIN 100 MG/1
100 TABLET, FILM COATED ORAL
Refills: 0 | Status: ACTIVE | COMMUNITY

## 2023-10-09 RX ORDER — CEFADROXIL 500 MG/1
500 CAPSULE ORAL
Refills: 0 | Status: ACTIVE | COMMUNITY

## 2023-10-09 RX ORDER — ASPIRIN 81 MG
81 TABLET, DELAYED RELEASE (ENTERIC COATED) ORAL
Refills: 0 | Status: ACTIVE | COMMUNITY

## 2023-10-09 RX ORDER — METOPROLOL SUCCINATE 50 MG/1
50 TABLET, EXTENDED RELEASE ORAL
Refills: 0 | Status: ACTIVE | COMMUNITY

## 2023-10-09 RX ORDER — PIOGLITAZONE HYDROCHLORIDE 30 MG/1
30 TABLET ORAL
Refills: 0 | Status: ACTIVE | COMMUNITY

## 2023-10-16 ENCOUNTER — APPOINTMENT (OUTPATIENT)
Dept: ORTHOPEDIC SURGERY | Facility: CLINIC | Age: 63
End: 2023-10-16
Payer: MEDICAID

## 2023-10-16 VITALS — HEART RATE: 75 BPM | SYSTOLIC BLOOD PRESSURE: 135 MMHG | DIASTOLIC BLOOD PRESSURE: 80 MMHG

## 2023-10-16 PROCEDURE — 99213 OFFICE O/P EST LOW 20 MIN: CPT

## 2023-10-30 ENCOUNTER — APPOINTMENT (OUTPATIENT)
Dept: ORTHOPEDIC SURGERY | Facility: CLINIC | Age: 63
End: 2023-10-30
Payer: MEDICAID

## 2023-10-30 DIAGNOSIS — S86.899A OTHER INJURY OF OTHER MUSCLE(S) AND TENDON(S) AT LOWER LEG LEVEL, UNSPECIFIED LEG, INITIAL ENCOUNTER: ICD-10-CM

## 2023-10-30 PROCEDURE — 73560 X-RAY EXAM OF KNEE 1 OR 2: CPT | Mod: LT

## 2023-10-30 PROCEDURE — 99213 OFFICE O/P EST LOW 20 MIN: CPT | Mod: 25

## 2023-11-27 ENCOUNTER — APPOINTMENT (OUTPATIENT)
Dept: ORTHOPEDIC SURGERY | Facility: CLINIC | Age: 63
End: 2023-11-27